# Patient Record
Sex: MALE | Race: OTHER | NOT HISPANIC OR LATINO | URBAN - METROPOLITAN AREA
[De-identification: names, ages, dates, MRNs, and addresses within clinical notes are randomized per-mention and may not be internally consistent; named-entity substitution may affect disease eponyms.]

---

## 2018-11-01 ENCOUNTER — EMERGENCY (EMERGENCY)
Facility: HOSPITAL | Age: 68
LOS: 1 days | Discharge: HOME | End: 2018-11-01
Attending: EMERGENCY MEDICINE | Admitting: EMERGENCY MEDICINE
Payer: MEDICARE

## 2018-11-01 VITALS
OXYGEN SATURATION: 98 % | TEMPERATURE: 99 F | HEART RATE: 91 BPM | DIASTOLIC BLOOD PRESSURE: 79 MMHG | RESPIRATION RATE: 17 BRPM | SYSTOLIC BLOOD PRESSURE: 180 MMHG | WEIGHT: 315 LBS

## 2018-11-01 VITALS
DIASTOLIC BLOOD PRESSURE: 89 MMHG | TEMPERATURE: 98 F | SYSTOLIC BLOOD PRESSURE: 169 MMHG | RESPIRATION RATE: 16 BRPM | OXYGEN SATURATION: 97 % | HEART RATE: 78 BPM

## 2018-11-01 DIAGNOSIS — K59.00 CONSTIPATION, UNSPECIFIED: ICD-10-CM

## 2018-11-01 DIAGNOSIS — R10.9 UNSPECIFIED ABDOMINAL PAIN: ICD-10-CM

## 2018-11-01 DIAGNOSIS — F43.0 ACUTE STRESS REACTION: ICD-10-CM

## 2018-11-01 DIAGNOSIS — Z98.890 OTHER SPECIFIED POSTPROCEDURAL STATES: Chronic | ICD-10-CM

## 2018-11-01 DIAGNOSIS — R11.0 NAUSEA: ICD-10-CM

## 2018-11-01 LAB
ALBUMIN SERPL ELPH-MCNC: 4.2 G/DL — SIGNIFICANT CHANGE UP (ref 3.3–5)
ALP SERPL-CCNC: 69 U/L — SIGNIFICANT CHANGE UP (ref 40–120)
ALT FLD-CCNC: 15 U/L — SIGNIFICANT CHANGE UP (ref 10–45)
ANION GAP SERPL CALC-SCNC: 13 MMOL/L — SIGNIFICANT CHANGE UP (ref 5–17)
AST SERPL-CCNC: 24 U/L — SIGNIFICANT CHANGE UP (ref 10–40)
BASOPHILS NFR BLD AUTO: 0.1 % — SIGNIFICANT CHANGE UP (ref 0–2)
BILIRUB SERPL-MCNC: 0.7 MG/DL — SIGNIFICANT CHANGE UP (ref 0.2–1.2)
BUN SERPL-MCNC: 14 MG/DL — SIGNIFICANT CHANGE UP (ref 7–23)
CALCIUM SERPL-MCNC: 9.2 MG/DL — SIGNIFICANT CHANGE UP (ref 8.4–10.5)
CHLORIDE SERPL-SCNC: 99 MMOL/L — SIGNIFICANT CHANGE UP (ref 96–108)
CO2 SERPL-SCNC: 26 MMOL/L — SIGNIFICANT CHANGE UP (ref 22–31)
CREAT SERPL-MCNC: 0.93 MG/DL — SIGNIFICANT CHANGE UP (ref 0.5–1.3)
GLUCOSE SERPL-MCNC: 111 MG/DL — HIGH (ref 70–99)
HCT VFR BLD CALC: 39.4 % — SIGNIFICANT CHANGE UP (ref 39–50)
HGB BLD-MCNC: 12.7 G/DL — LOW (ref 13–17)
LIDOCAIN IGE QN: 24 U/L — SIGNIFICANT CHANGE UP (ref 7–60)
LYMPHOCYTES # BLD AUTO: 12.8 % — LOW (ref 13–44)
MCHC RBC-ENTMCNC: 25.2 PG — LOW (ref 27–34)
MCHC RBC-ENTMCNC: 32.2 G/DL — SIGNIFICANT CHANGE UP (ref 32–36)
MCV RBC AUTO: 78.2 FL — LOW (ref 80–100)
MONOCYTES NFR BLD AUTO: 4.4 % — SIGNIFICANT CHANGE UP (ref 2–14)
NEUTROPHILS NFR BLD AUTO: 82.7 % — HIGH (ref 43–77)
PLATELET # BLD AUTO: 165 K/UL — SIGNIFICANT CHANGE UP (ref 150–400)
POTASSIUM SERPL-MCNC: 4.1 MMOL/L — SIGNIFICANT CHANGE UP (ref 3.5–5.3)
POTASSIUM SERPL-SCNC: 4.1 MMOL/L — SIGNIFICANT CHANGE UP (ref 3.5–5.3)
PROT SERPL-MCNC: 7.1 G/DL — SIGNIFICANT CHANGE UP (ref 6–8.3)
RBC # BLD: 5.04 M/UL — SIGNIFICANT CHANGE UP (ref 4.2–5.8)
RBC # FLD: 13.9 % — SIGNIFICANT CHANGE UP (ref 10.3–16.9)
SODIUM SERPL-SCNC: 138 MMOL/L — SIGNIFICANT CHANGE UP (ref 135–145)
WBC # BLD: 7.6 K/UL — SIGNIFICANT CHANGE UP (ref 3.8–10.5)
WBC # FLD AUTO: 7.6 K/UL — SIGNIFICANT CHANGE UP (ref 3.8–10.5)

## 2018-11-01 PROCEDURE — 99283 EMERGENCY DEPT VISIT LOW MDM: CPT | Mod: 25

## 2018-11-01 PROCEDURE — 85025 COMPLETE CBC W/AUTO DIFF WBC: CPT

## 2018-11-01 PROCEDURE — 99284 EMERGENCY DEPT VISIT MOD MDM: CPT

## 2018-11-01 PROCEDURE — 83690 ASSAY OF LIPASE: CPT

## 2018-11-01 PROCEDURE — 80053 COMPREHEN METABOLIC PANEL: CPT

## 2018-11-01 PROCEDURE — 36415 COLL VENOUS BLD VENIPUNCTURE: CPT

## 2018-11-01 PROCEDURE — 96360 HYDRATION IV INFUSION INIT: CPT

## 2018-11-01 RX ORDER — SODIUM CHLORIDE 9 MG/ML
1000 INJECTION INTRAMUSCULAR; INTRAVENOUS; SUBCUTANEOUS ONCE
Qty: 0 | Refills: 0 | Status: COMPLETED | OUTPATIENT
Start: 2018-11-01 | End: 2018-11-01

## 2018-11-01 RX ADMIN — SODIUM CHLORIDE 1000 MILLILITER(S): 9 INJECTION INTRAMUSCULAR; INTRAVENOUS; SUBCUTANEOUS at 13:25

## 2018-11-01 RX ADMIN — SODIUM CHLORIDE 2000 MILLILITER(S): 9 INJECTION INTRAMUSCULAR; INTRAVENOUS; SUBCUTANEOUS at 12:15

## 2018-11-01 NOTE — ED ADULT NURSE NOTE - NSIMPLEMENTINTERV_GEN_ALL_ED
Implemented All Universal Safety Interventions:  Todd to call system. Call bell, personal items and telephone within reach. Instruct patient to call for assistance. Room bathroom lighting operational. Non-slip footwear when patient is off stretcher. Physically safe environment: no spills, clutter or unnecessary equipment. Stretcher in lowest position, wheels locked, appropriate side rails in place.

## 2018-11-01 NOTE — ED PROVIDER NOTE - OBJECTIVE STATEMENT
69 y/o male with hx of psoriasis c/o abd pain, nausea and constipation x 4 days. pt states ate some oatmeal 4 days ago and developed pain and nausea shortly afterwards. pt notes he has been under a lot of stress as well. Pt notes BM 2 days ago and then small BM today. pt notes pain improved after passing gas and small BM today. no fever or chills. no bloody stool or melena. no back pain. no urinary sx's. no further complaints.

## 2018-11-01 NOTE — ED ADULT NURSE NOTE - OBJECTIVE STATEMENT
patient c/o abd pain x 3 days that currently has resided. Pt states this morning "I vomiting" and "I was just spit."

## 2018-11-01 NOTE — ED PROVIDER NOTE - ATTENDING CONTRIBUTION TO CARE
67 yo male h/o pvd, psoriasis c/o abd pain (bloated, distended sensation), nausea and constipation x 4 days after eating reheated oatmeal.  No fever.  Pt reports sx improved after he passed a lot of gas and had small bm pta.  No prior abd surg, h/o sbo.  No urinary sx.  No current pain, n/v.  No cp, palpitations.  Pt also c/o chronic le skin changes and swelling.  Well appearing, nad, nc/at, lung cta, heart reg, abd soft, nabs, nt/nd, ext no c/c, + chronic brawny skin changes and 1+ edema, dp +1, no gross neuro deficits.  Pt w resolved abd pain w/o ttp on exam, low risk for sbo and now w + flatus and bm, no vomiting.  Pt also c/o chronic le sx - suspect 2/2 pvd.  Plan labs, sw/cm, will try to get compression stockings.  Plan for dc.

## 2018-11-01 NOTE — ED PROVIDER NOTE - MEDICAL DECISION MAKING DETAILS
abdominal pain, nausea and consitpation. pt reports BM today. abd non tender and soft. do not suspect obstruction at this time given pt reports passing gas and BM and abd non tender. labs noted and d/w pt. pt with b/l PVD. ED referral coordinator to refer to vascular. f/u in clinic.

## 2018-11-05 ENCOUNTER — EMERGENCY (EMERGENCY)
Facility: HOSPITAL | Age: 68
LOS: 1 days | Discharge: HOME | End: 2018-11-05
Attending: EMERGENCY MEDICINE | Admitting: EMERGENCY MEDICINE
Payer: MEDICARE

## 2018-11-05 VITALS
DIASTOLIC BLOOD PRESSURE: 78 MMHG | TEMPERATURE: 98 F | RESPIRATION RATE: 17 BRPM | HEART RATE: 67 BPM | OXYGEN SATURATION: 98 % | SYSTOLIC BLOOD PRESSURE: 158 MMHG

## 2018-11-05 VITALS
OXYGEN SATURATION: 99 % | TEMPERATURE: 99 F | WEIGHT: 169.54 LBS | RESPIRATION RATE: 18 BRPM | SYSTOLIC BLOOD PRESSURE: 220 MMHG | HEART RATE: 95 BPM | DIASTOLIC BLOOD PRESSURE: 84 MMHG

## 2018-11-05 DIAGNOSIS — K59.00 CONSTIPATION, UNSPECIFIED: ICD-10-CM

## 2018-11-05 DIAGNOSIS — R14.0 ABDOMINAL DISTENSION (GASEOUS): ICD-10-CM

## 2018-11-05 DIAGNOSIS — Z98.890 OTHER SPECIFIED POSTPROCEDURAL STATES: Chronic | ICD-10-CM

## 2018-11-05 DIAGNOSIS — Z91.048 OTHER NONMEDICINAL SUBSTANCE ALLERGY STATUS: ICD-10-CM

## 2018-11-05 PROBLEM — L40.9 PSORIASIS, UNSPECIFIED: Chronic | Status: ACTIVE | Noted: 2018-11-01

## 2018-11-05 LAB
APPEARANCE UR: CLEAR — SIGNIFICANT CHANGE UP
BILIRUB UR-MCNC: NEGATIVE — SIGNIFICANT CHANGE UP
COLOR SPEC: YELLOW — SIGNIFICANT CHANGE UP
DIFF PNL FLD: NEGATIVE — SIGNIFICANT CHANGE UP
GLUCOSE UR QL: NEGATIVE — SIGNIFICANT CHANGE UP
KETONES UR-MCNC: ABNORMAL MG/DL
LEUKOCYTE ESTERASE UR-ACNC: NEGATIVE — SIGNIFICANT CHANGE UP
NITRITE UR-MCNC: NEGATIVE — SIGNIFICANT CHANGE UP
PH UR: 8 — SIGNIFICANT CHANGE UP (ref 5–8)
PROT UR-MCNC: NEGATIVE MG/DL — SIGNIFICANT CHANGE UP
SP GR SPEC: <=1.005 — SIGNIFICANT CHANGE UP (ref 1–1.03)
UROBILINOGEN FLD QL: 1 E.U./DL — SIGNIFICANT CHANGE UP

## 2018-11-05 PROCEDURE — 93010 ELECTROCARDIOGRAM REPORT: CPT

## 2018-11-05 PROCEDURE — 99284 EMERGENCY DEPT VISIT MOD MDM: CPT | Mod: 25

## 2018-11-05 PROCEDURE — 36415 COLL VENOUS BLD VENIPUNCTURE: CPT

## 2018-11-05 PROCEDURE — 85025 COMPLETE CBC W/AUTO DIFF WBC: CPT

## 2018-11-05 PROCEDURE — 74177 CT ABD & PELVIS W/CONTRAST: CPT | Mod: 26

## 2018-11-05 PROCEDURE — 81003 URINALYSIS AUTO W/O SCOPE: CPT

## 2018-11-05 PROCEDURE — 74177 CT ABD & PELVIS W/CONTRAST: CPT

## 2018-11-05 PROCEDURE — 83690 ASSAY OF LIPASE: CPT

## 2018-11-05 PROCEDURE — 80053 COMPREHEN METABOLIC PANEL: CPT

## 2018-11-05 PROCEDURE — 87086 URINE CULTURE/COLONY COUNT: CPT

## 2018-11-05 PROCEDURE — 93005 ELECTROCARDIOGRAM TRACING: CPT

## 2018-11-05 RX ORDER — DOCUSATE SODIUM 100 MG
100 CAPSULE ORAL ONCE
Qty: 0 | Refills: 0 | Status: COMPLETED | OUTPATIENT
Start: 2018-11-05 | End: 2018-11-05

## 2018-11-05 RX ORDER — IOHEXOL 300 MG/ML
30 INJECTION, SOLUTION INTRAVENOUS ONCE
Qty: 0 | Refills: 0 | Status: DISCONTINUED | OUTPATIENT
Start: 2018-11-05 | End: 2018-11-05

## 2018-11-05 RX ORDER — POLYETHYLENE GLYCOL 3350 17 G/17G
17 POWDER, FOR SOLUTION ORAL ONCE
Qty: 0 | Refills: 0 | Status: COMPLETED | OUTPATIENT
Start: 2018-11-05 | End: 2018-11-05

## 2018-11-05 RX ORDER — DOCUSATE SODIUM 100 MG
1 CAPSULE ORAL
Qty: 15 | Refills: 0 | OUTPATIENT
Start: 2018-11-05 | End: 2018-11-09

## 2018-11-05 RX ORDER — POLYETHYLENE GLYCOL 3350 17 G/17G
17 POWDER, FOR SOLUTION ORAL
Qty: 1 | Refills: 0 | OUTPATIENT
Start: 2018-11-05 | End: 2018-11-09

## 2018-11-05 RX ADMIN — Medication 100 MILLIGRAM(S): at 13:50

## 2018-11-05 RX ADMIN — POLYETHYLENE GLYCOL 3350 17 GRAM(S): 17 POWDER, FOR SOLUTION ORAL at 13:50

## 2018-11-05 NOTE — ED ADULT NURSE NOTE - OBJECTIVE STATEMENT
c/o diffused abdominal cramping and constipation x 4 days.  Last BM this am, but minimal per patient.  Patient verbalized taking maalox w/ minimal relief.  No recent sx or use of narcotics, n/v/d, fever, chills. Labs sent.  Patient remains in stable condition.  Awaiting lab results and further studies.  Continue to monitor.

## 2018-11-05 NOTE — ED ADULT NURSE NOTE - CHPI ED NUR SYMPTOMS NEG
no nausea/no abdominal distension/no vomiting/no burning urination/no blood in stool/no chills/no hematuria/no dysuria/no fever/no diarrhea

## 2018-11-05 NOTE — ED PROVIDER NOTE - ATTENDING CONTRIBUTION TO CARE
Pt is a 69yo m, no pmh/ psh, who p/w constipation with no bm x 5 days. Seen in ed 3d ago for same sx's and did not take any recommended medications at home. + small amt of liquidy stools in the past few days. + abd distention, no pain. Is passing flatus. No n/v, fever, chills, urinary sx's. Afebrile. HDS. WNWD m in nad. + s1, s2, rrr. Lungs cta b/l. Abd soft, mildly distended, non-tender, no guarding/ rebound. Labs wnl. Will arrange for ct a/p to r/o obstruction. Will continue to monitor. Pt is a 67yo m, no pmh/ psh, who p/w constipation with no bm x 5 days. Seen in ed 3d ago for same sx's and did not take any recommended medications at home. + small amt of liquidy stools in the past few days. + abd distention, no pain. Is passing flatus. No n/v, fever, chills, urinary sx's. No cp, sob. Afebrile. HDS. WNWD m in nad. + s1, s2, rrr. Lungs cta b/l. Abd soft, mildly distended, non-tender, no guarding/ rebound. Labs wnl. Will arrange for ct a/p to r/o obstruction. Will continue to monitor.

## 2018-11-05 NOTE — ED PROVIDER NOTE - OBJECTIVE STATEMENT
67 y/o male with a PMHx of psoriasis is present with feeling "bloated" and not being able to make a bm for the past 10 days. Pt states he felt constipated and has been 67 y/o male with a PMHx of psoriasis is present with feeling "bloated" and not being able to make a bm for the past 10 days. Pt states he felt constipated and has been passing what looks like a "watery type of stool", but feels as if bm is not complete. He reports only a small amount of bm yesterday and is still passing gas. 67 y/o male with a PMHx of psoriasis is present with feeling "bloated" and not being able to make a bm for the past 10 days. Pt states he felt constipated and has been passing what looks like a "watery type of stool", but feels as if bm is not complete. He reports only a small amount of bm yesterday and is still passing gas. Pt denies the following: fever, chills, nausea, vomiting, hx of abdominal surgery, abdominal pain, cough, travel, blood in stool, urinary symptoms. Pt states his diet has been changed because he was recently evicted from his apartment and eating anything he can, therefore concerned incorporating different types of food is causing him to be backed up. He states he was advised to take Miralax and colace, but can't afford it so has not been compliant with treatment.

## 2018-11-05 NOTE — ED ADULT TRIAGE NOTE - CHIEF COMPLAINT QUOTE
"I was here friday and im fed up with everything and they didn't really help me and I still got abdominal pain and cant go to the bathroom"

## 2018-11-07 LAB
CULTURE RESULTS: SIGNIFICANT CHANGE UP
SPECIMEN SOURCE: SIGNIFICANT CHANGE UP

## 2018-11-12 ENCOUNTER — APPOINTMENT (OUTPATIENT)
Dept: DERMATOLOGY | Facility: CLINIC | Age: 68
End: 2018-11-12

## 2018-11-14 VITALS
RESPIRATION RATE: 19 BRPM | OXYGEN SATURATION: 97 % | SYSTOLIC BLOOD PRESSURE: 165 MMHG | TEMPERATURE: 99 F | DIASTOLIC BLOOD PRESSURE: 91 MMHG | HEART RATE: 74 BPM

## 2018-11-14 LAB
ALBUMIN SERPL ELPH-MCNC: 4.2 G/DL — SIGNIFICANT CHANGE UP (ref 3.3–5)
ALP SERPL-CCNC: 59 U/L — SIGNIFICANT CHANGE UP (ref 40–120)
ALT FLD-CCNC: 22 U/L — SIGNIFICANT CHANGE UP (ref 10–45)
ANION GAP SERPL CALC-SCNC: 8 MMOL/L — SIGNIFICANT CHANGE UP (ref 5–17)
APTT BLD: 32.3 SEC — SIGNIFICANT CHANGE UP (ref 27.5–36.3)
AST SERPL-CCNC: 33 U/L — SIGNIFICANT CHANGE UP (ref 10–40)
BASOPHILS NFR BLD AUTO: 0.6 % — SIGNIFICANT CHANGE UP (ref 0–2)
BILIRUB SERPL-MCNC: 0.8 MG/DL — SIGNIFICANT CHANGE UP (ref 0.2–1.2)
BUN SERPL-MCNC: 11 MG/DL — SIGNIFICANT CHANGE UP (ref 7–23)
CALCIUM SERPL-MCNC: 9.2 MG/DL — SIGNIFICANT CHANGE UP (ref 8.4–10.5)
CHLORIDE SERPL-SCNC: 98 MMOL/L — SIGNIFICANT CHANGE UP (ref 96–108)
CO2 SERPL-SCNC: 28 MMOL/L — SIGNIFICANT CHANGE UP (ref 22–31)
CREAT SERPL-MCNC: 0.96 MG/DL — SIGNIFICANT CHANGE UP (ref 0.5–1.3)
EOSINOPHIL NFR BLD AUTO: 1.1 % — SIGNIFICANT CHANGE UP (ref 0–6)
GLUCOSE SERPL-MCNC: 102 MG/DL — HIGH (ref 70–99)
HCT VFR BLD CALC: 39.8 % — SIGNIFICANT CHANGE UP (ref 39–50)
HGB BLD-MCNC: 13.4 G/DL — SIGNIFICANT CHANGE UP (ref 13–17)
INR BLD: 1.37 — HIGH (ref 0.88–1.16)
LACTATE SERPL-SCNC: 1.1 MMOL/L — SIGNIFICANT CHANGE UP (ref 0.5–2)
LIDOCAIN IGE QN: 39 U/L — SIGNIFICANT CHANGE UP (ref 7–60)
LYMPHOCYTES # BLD AUTO: 25.8 % — SIGNIFICANT CHANGE UP (ref 13–44)
MCHC RBC-ENTMCNC: 25.5 PG — LOW (ref 27–34)
MCHC RBC-ENTMCNC: 33.7 G/DL — SIGNIFICANT CHANGE UP (ref 32–36)
MCV RBC AUTO: 75.8 FL — LOW (ref 80–100)
MONOCYTES NFR BLD AUTO: 8.9 % — SIGNIFICANT CHANGE UP (ref 2–14)
NEUTROPHILS NFR BLD AUTO: 63.6 % — SIGNIFICANT CHANGE UP (ref 43–77)
PLATELET # BLD AUTO: 187 K/UL — SIGNIFICANT CHANGE UP (ref 150–400)
POTASSIUM SERPL-MCNC: 3.4 MMOL/L — LOW (ref 3.5–5.3)
POTASSIUM SERPL-SCNC: 3.4 MMOL/L — LOW (ref 3.5–5.3)
PROT SERPL-MCNC: 6.9 G/DL — SIGNIFICANT CHANGE UP (ref 6–8.3)
PROTHROM AB SERPL-ACNC: 15.6 SEC — HIGH (ref 10–12.9)
RBC # BLD: 5.25 M/UL — SIGNIFICANT CHANGE UP (ref 4.2–5.8)
RBC # FLD: 13.6 % — SIGNIFICANT CHANGE UP (ref 10.3–16.9)
SODIUM SERPL-SCNC: 134 MMOL/L — LOW (ref 135–145)
WBC # BLD: 10.2 K/UL — SIGNIFICANT CHANGE UP (ref 3.8–10.5)
WBC # FLD AUTO: 10.2 K/UL — SIGNIFICANT CHANGE UP (ref 3.8–10.5)

## 2018-11-14 PROCEDURE — 74022 RADEX COMPL AQT ABD SERIES: CPT | Mod: 26

## 2018-11-14 RX ORDER — SOD SULF/SODIUM/NAHCO3/KCL/PEG
4000 SOLUTION, RECONSTITUTED, ORAL ORAL ONCE
Qty: 0 | Refills: 0 | Status: DISCONTINUED | OUTPATIENT
Start: 2018-11-14 | End: 2018-11-14

## 2018-11-14 RX ORDER — LACTULOSE 10 G/15ML
20 SOLUTION ORAL ONCE
Qty: 0 | Refills: 0 | Status: DISCONTINUED | OUTPATIENT
Start: 2018-11-14 | End: 2018-11-14

## 2018-11-14 RX ORDER — IOHEXOL 300 MG/ML
30 INJECTION, SOLUTION INTRAVENOUS ONCE
Qty: 0 | Refills: 0 | Status: COMPLETED | OUTPATIENT
Start: 2018-11-14 | End: 2018-11-14

## 2018-11-14 RX ADMIN — Medication 10 MILLIGRAM(S): at 22:43

## 2018-11-14 RX ADMIN — IOHEXOL 30 MILLILITER(S): 300 INJECTION, SOLUTION INTRAVENOUS at 23:09

## 2018-11-14 NOTE — ED ADULT TRIAGE NOTE - CHIEF COMPLAINT QUOTE
Pt states "I have been here many times for constipation and no one is doing anything to fix it. I have seen 5 doctors and they all say the same thing but they send me home anyway. I have a lot of noise in my stomach and when I go to the bathroom only water comes out x 17 days." Denies vomiting. Pt with d/c papers with dx constipation.

## 2018-11-14 NOTE — ED ADULT NURSE NOTE - OBJECTIVE STATEMENT
69 y/o pt AOx3 arrived to the ED c/o having watery stool x 17days. Pt states he has been seen in the ED and no one has done anything for him.

## 2018-11-14 NOTE — ED PROVIDER NOTE - MEDICAL DECISION MAKING DETAILS
Pt w diffuse abdominal distension, bloating sensation. Last CT with ileus, states cannot remember when last passed full BM, watery bowel movements since then, initial XR with dilated loops, ?air fluid levels. dulcolax suppository, will obtain CT a/p to eval for obs. Anticipate dc if  no acute process with GI fu. Pt w diffuse abdominal distension, bloating sensation. Last CT with ileus, states cannot remember when last passed full BM, watery bowel movements since then, initial XR with dilated loops, appearance of air fluid levels. dulcolax suppository, will obtain CT a/p to eval for obs. Anticipate dc if  no acute process with GI fu.

## 2018-11-14 NOTE — ED PROVIDER NOTE - PHYSICAL EXAMINATION
CONSTITUTIONAL: Well appearing, well nourished, awake, alert and in no apparent distress.  HEENT: Head is atraumatic. Eyes clear bilaterally, normal EOMI. Airway patent.  CARDIAC: Normal rate, regular rhythm.  Heart sounds S1, S2.   RESPIRATORY: Breath sounds clear and equal bilaterally. no tachypnea, respiratory distress.   GASTROINTESTINAL: Abdomen soft, mildly distended, no g/r  MUSCULOSKELETAL: Spine appears normal, no midline spinal tenderness, range of motion is not limited, no muscle or joint tenderness. no bony tenderness. no JVD, peripheral edema.   NEUROLOGICAL: Alert and oriented, no focal deficits, no motor or sensory deficits.  SKIN: Skin normal color for race, warm, dry and intact. No evidence of rash.  PSYCHIATRIC: Alert and oriented to person, place, time/situation. normal mood and affect. no apparent risk to self or others. CONSTITUTIONAL: Well appearing, well nourished, awake, alert and in no apparent distress.  HEENT: Head is atraumatic. Eyes clear bilaterally, normal EOMI. Airway patent.  CARDIAC: Normal rate, regular rhythm.  Heart sounds S1, S2.   RESPIRATORY: Breath sounds clear and equal bilaterally. no tachypnea, respiratory distress.   GASTROINTESTINAL: Abdomen soft, mildly distended, no g/r  MUSCULOSKELETAL: Spine appears normal, no midline spinal tenderness, range of motion is not limited, no muscle or joint tenderness. no bony tenderness. no JVD, peripheral edema.   NEUROLOGICAL: Alert and oriented, no focal deficits, no motor or sensory deficits.  SKIN: Skin normal color for race, warm, dry and intact. No evidence of rash.  PSYCHIATRIC: Alert and oriented to person, place, time/situation. normal mood and affect. no apparent risk to self or others.  RectaL: no obs stool mass, blood, black stool

## 2018-11-14 NOTE — ED PROVIDER NOTE - OBJECTIVE STATEMENT
69 y/o male with hx of psoriasis w complaints ongoing abdominal discomfort, bloating, distension since the first time since first seen here at [facility] on Nov 1st. using stool softners, miralax given on prior dc w minimal relief. Prior CT scan showing ileus with no def stricture. intermittent vomiting nbnb, last episode last night with white material per pt. last passed  gas and watery stool described as dark in nature at 530pm today and attributing it to peptobismol use. no melena, red blood in stool, chest pain, sob, f/c. no prior abdominal surgeries.

## 2018-11-15 ENCOUNTER — INPATIENT (INPATIENT)
Facility: HOSPITAL | Age: 68
LOS: 0 days | Discharge: HOME | DRG: 392 | End: 2018-11-16
Attending: SURGERY | Admitting: SURGERY
Payer: MEDICARE

## 2018-11-15 DIAGNOSIS — Z98.890 OTHER SPECIFIED POSTPROCEDURAL STATES: Chronic | ICD-10-CM

## 2018-11-15 LAB
ANION GAP SERPL CALC-SCNC: 7 MMOL/L — SIGNIFICANT CHANGE UP (ref 5–17)
APPEARANCE UR: CLEAR — SIGNIFICANT CHANGE UP
BASOPHILS NFR BLD AUTO: 0.4 % — SIGNIFICANT CHANGE UP (ref 0–2)
BILIRUB UR-MCNC: NEGATIVE — SIGNIFICANT CHANGE UP
BUN SERPL-MCNC: 9 MG/DL — SIGNIFICANT CHANGE UP (ref 7–23)
CALCIUM SERPL-MCNC: 8.6 MG/DL — SIGNIFICANT CHANGE UP (ref 8.4–10.5)
CHLORIDE SERPL-SCNC: 99 MMOL/L — SIGNIFICANT CHANGE UP (ref 96–108)
CO2 SERPL-SCNC: 28 MMOL/L — SIGNIFICANT CHANGE UP (ref 22–31)
COLOR SPEC: YELLOW — SIGNIFICANT CHANGE UP
CREAT SERPL-MCNC: 0.91 MG/DL — SIGNIFICANT CHANGE UP (ref 0.5–1.3)
DIFF PNL FLD: NEGATIVE — SIGNIFICANT CHANGE UP
EOSINOPHIL NFR BLD AUTO: 1.6 % — SIGNIFICANT CHANGE UP (ref 0–6)
GLUCOSE SERPL-MCNC: 97 MG/DL — SIGNIFICANT CHANGE UP (ref 70–99)
GLUCOSE UR QL: NEGATIVE — SIGNIFICANT CHANGE UP
HCT VFR BLD CALC: 35.9 % — LOW (ref 39–50)
HGB BLD-MCNC: 12.2 G/DL — LOW (ref 13–17)
KETONES UR-MCNC: ABNORMAL MG/DL
LEUKOCYTE ESTERASE UR-ACNC: NEGATIVE — SIGNIFICANT CHANGE UP
LYMPHOCYTES # BLD AUTO: 12.1 % — LOW (ref 13–44)
MAGNESIUM SERPL-MCNC: 2.1 MG/DL — SIGNIFICANT CHANGE UP (ref 1.6–2.6)
MCHC RBC-ENTMCNC: 26 PG — LOW (ref 27–34)
MCHC RBC-ENTMCNC: 34 G/DL — SIGNIFICANT CHANGE UP (ref 32–36)
MCV RBC AUTO: 76.5 FL — LOW (ref 80–100)
MONOCYTES NFR BLD AUTO: 8.2 % — SIGNIFICANT CHANGE UP (ref 2–14)
NEUTROPHILS NFR BLD AUTO: 77.7 % — HIGH (ref 43–77)
NITRITE UR-MCNC: NEGATIVE — SIGNIFICANT CHANGE UP
PH UR: 6 — SIGNIFICANT CHANGE UP (ref 5–8)
PHOSPHATE SERPL-MCNC: 3.7 MG/DL — SIGNIFICANT CHANGE UP (ref 2.5–4.5)
PLATELET # BLD AUTO: 132 K/UL — LOW (ref 150–400)
POTASSIUM SERPL-MCNC: 3 MMOL/L — LOW (ref 3.5–5.3)
POTASSIUM SERPL-SCNC: 3 MMOL/L — LOW (ref 3.5–5.3)
PROT UR-MCNC: NEGATIVE MG/DL — SIGNIFICANT CHANGE UP
RBC # BLD: 4.69 M/UL — SIGNIFICANT CHANGE UP (ref 4.2–5.8)
RBC # FLD: 13.7 % — SIGNIFICANT CHANGE UP (ref 10.3–16.9)
SODIUM SERPL-SCNC: 134 MMOL/L — LOW (ref 135–145)
SP GR SPEC: 1.02 — SIGNIFICANT CHANGE UP (ref 1–1.03)
UROBILINOGEN FLD QL: 0.2 E.U./DL — SIGNIFICANT CHANGE UP
WBC # BLD: 6.8 K/UL — SIGNIFICANT CHANGE UP (ref 3.8–10.5)
WBC # FLD AUTO: 6.8 K/UL — SIGNIFICANT CHANGE UP (ref 3.8–10.5)

## 2018-11-15 PROCEDURE — 74177 CT ABD & PELVIS W/CONTRAST: CPT | Mod: 26

## 2018-11-15 PROCEDURE — 99285 EMERGENCY DEPT VISIT HI MDM: CPT

## 2018-11-15 PROCEDURE — 99223 1ST HOSP IP/OBS HIGH 75: CPT | Mod: GC

## 2018-11-15 PROCEDURE — 99223 1ST HOSP IP/OBS HIGH 75: CPT

## 2018-11-15 RX ORDER — ACETAMINOPHEN 500 MG
650 TABLET ORAL EVERY 6 HOURS
Qty: 0 | Refills: 0 | Status: DISCONTINUED | OUTPATIENT
Start: 2018-11-15 | End: 2018-11-16

## 2018-11-15 RX ORDER — ACETAMINOPHEN 500 MG
325 TABLET ORAL EVERY 6 HOURS
Qty: 0 | Refills: 0 | Status: DISCONTINUED | OUTPATIENT
Start: 2018-11-15 | End: 2018-11-16

## 2018-11-15 RX ORDER — SODIUM CHLORIDE 9 MG/ML
1000 INJECTION, SOLUTION INTRAVENOUS
Qty: 0 | Refills: 0 | Status: DISCONTINUED | OUTPATIENT
Start: 2018-11-15 | End: 2018-11-16

## 2018-11-15 RX ORDER — ONDANSETRON 8 MG/1
4 TABLET, FILM COATED ORAL EVERY 6 HOURS
Qty: 0 | Refills: 0 | Status: DISCONTINUED | OUTPATIENT
Start: 2018-11-15 | End: 2018-11-16

## 2018-11-15 RX ORDER — MULTIVIT WITH MIN/MFOLATE/K2 340-15/3 G
300 POWDER (GRAM) ORAL ONCE
Qty: 0 | Refills: 0 | Status: COMPLETED | OUTPATIENT
Start: 2018-11-15 | End: 2018-11-15

## 2018-11-15 RX ORDER — MULTIVIT WITH MIN/MFOLATE/K2 340-15/3 G
300 POWDER (GRAM) ORAL ONCE
Qty: 0 | Refills: 0 | Status: DISCONTINUED | OUTPATIENT
Start: 2018-11-15 | End: 2018-11-15

## 2018-11-15 RX ORDER — POTASSIUM CHLORIDE 20 MEQ
10 PACKET (EA) ORAL
Qty: 0 | Refills: 0 | Status: COMPLETED | OUTPATIENT
Start: 2018-11-15 | End: 2018-11-15

## 2018-11-15 RX ORDER — HEPARIN SODIUM 5000 [USP'U]/ML
5000 INJECTION INTRAVENOUS; SUBCUTANEOUS EVERY 8 HOURS
Qty: 0 | Refills: 0 | Status: DISCONTINUED | OUTPATIENT
Start: 2018-11-15 | End: 2018-11-16

## 2018-11-15 RX ADMIN — Medication 100 MILLIEQUIVALENT(S): at 08:27

## 2018-11-15 RX ADMIN — Medication 100 MILLIEQUIVALENT(S): at 10:08

## 2018-11-15 RX ADMIN — HEPARIN SODIUM 5000 UNIT(S): 5000 INJECTION INTRAVENOUS; SUBCUTANEOUS at 05:58

## 2018-11-15 RX ADMIN — SODIUM CHLORIDE 75 MILLILITER(S): 9 INJECTION, SOLUTION INTRAVENOUS at 03:46

## 2018-11-15 RX ADMIN — Medication 300 MILLILITER(S): at 23:45

## 2018-11-15 RX ADMIN — Medication 650 MILLIGRAM(S): at 19:04

## 2018-11-15 RX ADMIN — Medication 650 MILLIGRAM(S): at 16:32

## 2018-11-15 RX ADMIN — Medication 100 MILLIEQUIVALENT(S): at 11:46

## 2018-11-15 RX ADMIN — HEPARIN SODIUM 5000 UNIT(S): 5000 INJECTION INTRAVENOUS; SUBCUTANEOUS at 23:45

## 2018-11-15 RX ADMIN — HEPARIN SODIUM 5000 UNIT(S): 5000 INJECTION INTRAVENOUS; SUBCUTANEOUS at 15:56

## 2018-11-15 NOTE — CONSULT NOTE ADULT - SUBJECTIVE AND OBJECTIVE BOX
HPI:  69 yo M, w/ PMHx of psoriasis has been seen in [facility] ED 2 separate encounters, now presents with similar complaints of distension, trouble having BM, and generalized abdominal discomfort and reason for GI consult.  Complains symptoms have been present for the past 2 weeks while the patient is under immense stress due to ongoing legal situation. Patient was seen in ED on 11/1 & 11/5 was diagnosed with ileus and prescribed laxatives and stool softeners provided some relief. Patient's last BM was yesterday at home, without nausea or vomiting. Also pt had multiple BM in ED, after water enema, and Go-Lytely given. Denies any fevers, N/V/D, history of IBD/IBS, history of abdominal surgery, recent abx use, melena, and hematochezia     Patient's last colonoscopy was 10 years ago, which he reports was normal. No history of UC or Crohn's. Denies any episodes of diverticulitis, or LLQ pain requiring admission or ABX.     T(C): 36.9 (14 Nov 2018 21:44), Max: 37.1 (14 Nov 2018 21:01)  T(F): 98.5 (14 Nov 2018 21:44), Max: 98.7 (14 Nov 2018 21:01)  HR: 96 (14 Nov 2018 21:44) (74 - 96)  BP: 168/104 (14 Nov 2018 21:44) (165/91 - 168/104)  RR: 20 (14 Nov 2018 21:44) (19 - 20)  SpO2: 99% (14 Nov 2018 21:44) (97% - 99%) (15 Nov 2018 02:43)    Allergies    No Known Drug Allergies  pollen (Unknown)    Intolerances      Home Medications:    MEDICATIONS:  MEDICATIONS  (STANDING):  heparin  Injectable 5000 Unit(s) SubCutaneous every 8 hours  lactated ringers. 1000 milliLiter(s) (75 mL/Hr) IV Continuous <Continuous>  potassium chloride  10 mEq/100 mL IVPB 10 milliEquivalent(s) IV Intermittent every 1 hour    MEDICATIONS  (PRN):  acetaminophen   Tablet .. 325 milliGRAM(s) Oral every 6 hours PRN Moderate Pain (4 - 6)  acetaminophen   Tablet .. 650 milliGRAM(s) Oral every 6 hours PRN Severe Pain (7 - 10)  ondansetron Injectable 4 milliGRAM(s) IV Push every 6 hours PRN Nausea    PAST MEDICAL & SURGICAL HISTORY:  Psoriasis  H/O knee surgery    FAMILY HISTORY:  No pertinent family history in first degree relatives    SOCIAL HISTORY:  [place]: [ ] Current, [ ] Former, [ ] Never; Pack Years:  Alcohol:  Illicit Drugs:    REVIEW OF SYSTEMS:  CONSTITUTIONAL: No weakness, fevers or chills  HEENT: No visual changes; No vertigo or throat pain   NECK: No pain or stiffness  RESPIRATORY: No cough, wheezing, hemoptysis; No shortness of breath  CARDIOVASCULAR: No chest pain or palpitations  GASTROINTESTINAL: No abdominal or epigastric pain. No nausea, vomiting, or hematemesis; No diarrhea or constipation. No melena or hematochezia.  GENITOURINARY: No dysuria, frequency or hematuria  NEUROLOGICAL: No numbness or weakness  SKIN: No itching, burning, rashes, or lesions   All other 10 review of systems is negative unless indicated above.    Vital Signs Last 24 Hrs  T(C): 36.8 (15 Nov 2018 06:40), Max: 37.1 (14 Nov 2018 21:01)  T(F): 98.3 (15 Nov 2018 06:40), Max: 98.7 (14 Nov 2018 21:01)  HR: 62 (15 Nov 2018 06:40) (62 - 96)  BP: 121/57 (15 Nov 2018 06:40) (121/57 - 168/104)  BP(mean): --  RR: 20 (15 Nov 2018 06:40) (19 - 20)  SpO2: 98% (15 Nov 2018 06:40) (97% - 99%)      PHYSICAL EXAM:    General: Well developed; well nourished; in no acute distress  Eyes: Anicteric sclerae, moist conjunctivae  HENT: Moist mucous membranes  Neck: Trachea midline, supple  Lungs: Normal respiratory effors and no intercostal retractions  Cardiovascular: RRR  Abdomen: Soft, non-tender non-distended; Normal bowel sounds; No rebound or guarding  Extremities: Normal range of motion, No clubbing, cyanosis or edema  Neurological: Alert and oriented x3  Skin: Warm and dry. No obvious rash    LABS:                        12.2   6.8   )-----------( 132      ( 15 Nov 2018 06:33 )             35.9     11-15    134<L>  |  99  |  9   ----------------------------<  97  3.0<L>   |  28  |  0.91    Ca    8.6      15 Nov 2018 06:33  Phos  3.7     11-15  Mg     2.1     11-15    TPro  6.9  /  Alb  4.2  /  TBili  0.8  /  DBili  x   /  AST  33  /  ALT  22  /  AlkPhos  59  11-14        PT/INR - ( 14 Nov 2018 23:04 )   PT: 15.6 sec;   INR: 1.37          PTT - ( 14 Nov 2018 23:04 )  PTT:32.3 sec    RADIOLOGY & ADDITIONAL STUDIES: HPI:  69 yo M, w/ PMHx of psoriasis has been seen in [facility] ED 2 separate encounters, now presents with similar complaints of distension, trouble having BM, and generalized abdominal discomfort and reason for GI consult.  Complains symptoms have been present for the past 2 weeks while the patient is under immense stress due to ongoing legal situation. Patient was seen in ED on 11/1 & 11/5 was diagnosed with ileus and prescribed laxatives and stool softeners provided some relief. Patient's last BM was yesterday at home, without nausea or vomiting. Also pt had multiple BM in ED, after water enema, and Go-Lytely given. Denies any fevers, N/V/D, history of IBD/IBS, history of abdominal surgery, recent abx use, melena, and hematochezia.  Patient's last colonoscopy was 10 years ago, which he reports was normal.    T(C): 36.9 (14 Nov 2018 21:44), Max: 37.1 (14 Nov 2018 21:01)  T(F): 98.5 (14 Nov 2018 21:44), Max: 98.7 (14 Nov 2018 21:01)  HR: 96 (14 Nov 2018 21:44) (74 - 96)  BP: 168/104 (14 Nov 2018 21:44) (165/91 - 168/104)  RR: 20 (14 Nov 2018 21:44) (19 - 20)  SpO2: 99% (14 Nov 2018 21:44) (97% - 99%) (15 Nov 2018 02:43)    Allergies    No Known Drug Allergies  pollen (Unknown)    Intolerances      Home Medications:    MEDICATIONS:  MEDICATIONS  (STANDING):  heparin  Injectable 5000 Unit(s) SubCutaneous every 8 hours  lactated ringers. 1000 milliLiter(s) (75 mL/Hr) IV Continuous <Continuous>  potassium chloride  10 mEq/100 mL IVPB 10 milliEquivalent(s) IV Intermittent every 1 hour    MEDICATIONS  (PRN):  acetaminophen   Tablet .. 325 milliGRAM(s) Oral every 6 hours PRN Moderate Pain (4 - 6)  acetaminophen   Tablet .. 650 milliGRAM(s) Oral every 6 hours PRN Severe Pain (7 - 10)  ondansetron Injectable 4 milliGRAM(s) IV Push every 6 hours PRN Nausea    PAST MEDICAL & SURGICAL HISTORY:  Psoriasis  H/O knee surgery    FAMILY HISTORY:  No pertinent family history in first degree relatives    SOCIAL HISTORY:  [place]: [ ] Current, [ ] Former, [ ] Never; Pack Years:  Alcohol:  Illicit Drugs:    REVIEW OF SYSTEMS:  CONSTITUTIONAL: No weakness, fevers or chills  HEENT: No visual changes; No vertigo or throat pain   NECK: No pain or stiffness  RESPIRATORY: No cough, wheezing, hemoptysis; No shortness of breath  CARDIOVASCULAR: No chest pain or palpitations  GASTROINTESTINAL: No abdominal or epigastric pain. No nausea, vomiting, or hematemesis; No diarrhea or constipation. No melena or hematochezia. Positive distension  GENITOURINARY: No dysuria, frequency or hematuria  NEUROLOGICAL: No numbness or weakness  SKIN: No itching, burning, rashes, or lesions   All other 10 review of systems is negative unless indicated above.    Vital Signs Last 24 Hrs  T(C): 36.8 (15 Nov 2018 06:40), Max: 37.1 (14 Nov 2018 21:01)  T(F): 98.3 (15 Nov 2018 06:40), Max: 98.7 (14 Nov 2018 21:01)  HR: 62 (15 Nov 2018 06:40) (62 - 96)  BP: 121/57 (15 Nov 2018 06:40) (121/57 - 168/104)  BP(mean): --  RR: 20 (15 Nov 2018 06:40) (19 - 20)  SpO2: 98% (15 Nov 2018 06:40) (97% - 99%)      PHYSICAL EXAM:    General: Well developed; well nourished; in no acute distress  Eyes: Anicteric sclerae, moist conjunctivae  HENT: Moist mucous membranes  Neck: Trachea midline, supple  Lungs: Normal respiratory effors and no intercostal retractions  Cardiovascular: RRR  Abdomen: Soft, non-tender. Distended; Hyperactive bowel sounds in LLQ; No rebound or guarding  Extremities: Normal range of motion, No clubbing, cyanosis or edema  Neurological: Alert and oriented x3  Skin: Warm and dry. No obvious rash    LABS:                        12.2   6.8   )-----------( 132      ( 15 Nov 2018 06:33 )             35.9     11-15    134<L>  |  99  |  9   ----------------------------<  97  3.0<L>   |  28  |  0.91    Ca    8.6      15 Nov 2018 06:33  Phos  3.7     11-15  Mg     2.1     11-15    TPro  6.9  /  Alb  4.2  /  TBili  0.8  /  DBili  x   /  AST  33  /  ALT  22  /  AlkPhos  59  11-14        PT/INR - ( 14 Nov 2018 23:04 )   PT: 15.6 sec;   INR: 1.37          PTT - ( 14 Nov 2018 23:04 )  PTT:32.3 sec    RADIOLOGY & ADDITIONAL STUDIES: HPI:  68yr old M with PMHx significant for psoriasis who presents for evaluation of abdominal distention and difficulty defecating.  Patient has been seen in [facility] ED on 2 separate encounters (11/1/18 and 11/5/18), and was discharged after being diagnosed with ileus and discharged with stool softneres and laxatives which provided some relief, but comes back with similar complaints of distension, trouble having a BM, and generalized abdominal discomfort.    According to patient he reports that his symptoms have been present for the past 2 weeks while the patient is under immense stress due to ongoing legal situation. Patient's last BM was yesterday at home, without nausea or vomiting. Also pt had multiple BM in ED, after water enema, and Go-Lytely prep given. Denies any fevers, N/V/D, history of IBD/IBS, history of abdominal surgery, recent abx use, melena, and hematochezia.      EGD - never  Colonoscopy - 10yrs ago normal per patient    T(C): 36.9 (14 Nov 2018 21:44), Max: 37.1 (14 Nov 2018 21:01)  T(F): 98.5 (14 Nov 2018 21:44), Max: 98.7 (14 Nov 2018 21:01)  HR: 96 (14 Nov 2018 21:44) (74 - 96)  BP: 168/104 (14 Nov 2018 21:44) (165/91 - 168/104)  RR: 20 (14 Nov 2018 21:44) (19 - 20)  SpO2: 99% (14 Nov 2018 21:44) (97% - 99%) (15 Nov 2018 02:43)    Allergies  No Known Drug Allergies  pollen (Unknown)    Intolerances      Home Medications:    MEDICATIONS:  MEDICATIONS  (STANDING):  heparin  Injectable 5000 Unit(s) SubCutaneous every 8 hours  lactated ringers. 1000 milliLiter(s) (75 mL/Hr) IV Continuous <Continuous>  potassium chloride  10 mEq/100 mL IVPB 10 milliEquivalent(s) IV Intermittent every 1 hour    MEDICATIONS  (PRN):  acetaminophen   Tablet .. 325 milliGRAM(s) Oral every 6 hours PRN Moderate Pain (4 - 6)  acetaminophen   Tablet .. 650 milliGRAM(s) Oral every 6 hours PRN Severe Pain (7 - 10)  ondansetron Injectable 4 milliGRAM(s) IV Push every 6 hours PRN Nausea    PAST MEDICAL & SURGICAL HISTORY:  Psoriasis  H/O knee surgery    FAMILY HISTORY:  No pertinent family history in first degree relatives    SOCIAL HISTORY:  [place]: [ ] Current, [ ] Former, [ ] Never; Pack Years:  Alcohol: denies  Illicit Drugs: denies    REVIEW OF SYSTEMS:  CONSTITUTIONAL: No weakness, fevers or chills  HEENT: No visual changes; No vertigo or throat pain   NECK: No pain or stiffness  RESPIRATORY: No cough, wheezing, hemoptysis; No shortness of breath  CARDIOVASCULAR: No chest pain or palpitations  GASTROINTESTINAL: No abdominal or epigastric pain. No nausea, vomiting, or hematemesis; No diarrhea or constipation. No melena or hematochezia. Positive distension  GENITOURINARY: No dysuria, frequency or hematuria  NEUROLOGICAL: No numbness or weakness  SKIN: No itching, burning, rashes, or lesions   All other 10 review of systems is negative unless indicated above.    Vital Signs Last 24 Hrs  T(C): 36.8 (15 Nov 2018 06:40), Max: 37.1 (14 Nov 2018 21:01)  T(F): 98.3 (15 Nov 2018 06:40), Max: 98.7 (14 Nov 2018 21:01)  HR: 62 (15 Nov 2018 06:40) (62 - 96)  BP: 121/57 (15 Nov 2018 06:40) (121/57 - 168/104)  BP(mean): --  RR: 20 (15 Nov 2018 06:40) (19 - 20)  SpO2: 98% (15 Nov 2018 06:40) (97% - 99%)      PHYSICAL EXAM:    General: Well developed; well nourished; in no acute distress  Eyes: Anicteric sclerae, moist conjunctivae  HENT: Moist mucous membranes  Neck: Trachea midline, supple  Lungs: Normal respiratory effors and no intercostal retractions  Cardiovascular: RRR  Abdomen: Soft, non-tender. Distended; Hyperactive bowel sounds in LLQ; No rebound or guarding  Extremities: Normal range of motion, No clubbing, cyanosis or edema  Neurological: Alert and oriented x3  Skin: Warm and dry. No obvious rash    LABS:                        12.2   6.8   )-----------( 132      ( 15 Nov 2018 06:33 )             35.9     11-15    134<L>  |  99  |  9   ----------------------------<  97  3.0<L>   |  28  |  0.91    Ca    8.6      15 Nov 2018 06:33  Phos  3.7     11-15  Mg     2.1     11-15    TPro  6.9  /  Alb  4.2  /  TBili  0.8  /  DBili  x   /  AST  33  /  ALT  22  /  AlkPhos  59  11-14    PT/INR - ( 14 Nov 2018 23:04 )   PT: 15.6 sec;   INR: 1.37       PTT - ( 14 Nov 2018 23:04 )  PTT:32.3 sec        RADIOLOGY & ADDITIONAL STUDIES:     CT Abdomen and Pelvis w/ Oral Cont and w/ IV Cont (11.15.18 @ 00:48) >  IMPRESSION:   Since 11/5/2018, worsening of large bowel distention, now measuring up to 7.3 cm in transverse diameter in the sigmoid colon.   Abrupt point of transition within the mid sigmoid colon where there is now more prominent colonic wall thickening with adjacent pericolic fat stranding extending from the point of transition to the rectum. Findings may represent a focal proctocolitis. Recommend further workup and interrogation involving the area of abrupt point of transition within the mid sigmoid colon where there is marked luminal narrowing as underlying stricture/colonic neoplasm cannot be excluded on the basis of this study.   Recommend close continued follow-up to resolution of the partial large bowel obstruction.    Ancillary findings as above.

## 2018-11-15 NOTE — H&P ADULT - NSHPLABSRESULTS_GEN_ALL_CORE
LABS/RADIOLOGY RESULTS:                          13.4   10.2  )-----------( 187      ( 2018 23:04 )             39.8   11-14    134<L>  |  98  |  11  ----------------------------<  102<H>  3.4<L>   |  28  |  0.96    Ca    9.2      2018 23:04    TPro  6.9  /  Alb  4.2  /  TBili  0.8  /  DBili  x   /  AST  33  /  ALT  22  /  AlkPhos  59  11-14  PT/INR - ( 2018 23:04 )   PT: 15.6 sec;   INR: 1.37          PTT - ( 2018 23:04 )  PTT:32.3 secBlood Cultures    Urinalysis Basic - ( 15 Nov 2018 00:12 )    Color: Yellow / Appearance: Clear / S.020 / pH:   Gluc:  / Ketone: Trace mg/dL  / Bili: Negative / Urobili: 0.2 E.U./dL   Blood:  / Protein: NEGATIVE mg/dL / Nitrite: NEGATIVE   Leuk Esterase: NEGATIVE / RBC:  / WBC    Sq Epi:  / Non Sq Epi:  / Bacteria:     < from: CT Abdomen and Pelvis w/ Oral Cont and w/ IV Cont (11.15.18 @ 00:48) >      FINDINGS:    Lower chest: Clear lung bases.     Liver: Smooth in contour. No change in hepatic segment 7 hypodense   lesion, likely representing a hemangioma. Several subcentimeter hypodense   lesions are also seen in the right lobe whichare too small to   characterize. Portal and hepatic veins are patent.    Biliary system: Gallbladder is normal in size. No calcified gallstones.   No biliary ductal dilatation.    Pancreas: No peripancreatic inflammatory change.    Spleen: No splenomegaly.    Adrenal glands: Unremarkable.    Kidneys: Symmetric renal enhancement without hydronephrosis or evidence   of obstructing calculus. Peripelvic left renal cysts. Urinary bladder   grossly unremarkable.     Urinary Bladder: Unremarkable.    Reproductive organs: Enlarged heterogeneous prostate gland measuring 4.5   x 3.5 cm..     Bowel/Peritoneum: Again, there is dilation of of the large bowel   measuring up to 7 cm in the transverse colon and 7.3 cm in the sigmoid   colon, increased since the prior study. Again, there is smooth tapering   in the distal sigmoid/ rectosigmoid junction which is the point of   transition. There is a slight incomplete swirl of the mesentery at this   region. There is now more prominent wall thickening involving the   mid-distal descending colon extending from the point of transition within   the mid sigmoid colon to the rectum. There is adjacent pericolic fat   stranding. Findings are suggestive for active infectious/inflammatory   process such as proctocolitis. At the persistent point of marked   narrowing within the mid sigmoid colon which is unchanged since the prior   study, the possibility of an underlying stricture/colonic neoplasm is not   excluded and further workup is recommended to excludeneoplasm.    Normal caliber appendix. Stool and gas throughout the visualized large   bowel extending to the point of transition within the mid sigmoid colon.    No evidence of intraperitoneal free air or ascites. Small amount of   intrapelvic ascites is present.    Lymph nodes: No bulky pelvic sidewall or retroperitoneal lymphadenopathy   is present. There are scattered subcentimeter periaortic and aortocaval   lymph nodes.    Bones/Soft tissues: Mild degenerative changes of the spine.       IMPRESSION: Since 2018, worsening of large bowel distention, now   measuring up to 7.3 cm in transverse diameter in the sigmoid colon.   Abrupt point of transition within the mid sigmoid colon where there is   now more prominent colonic wall thickening with adjacent pericolic fat   stranding extending from the point of transition to the rectum. Findings   may represent a focal proctocolitis. Recommend further workup and   interrogation involving the area of abrupt point of transition within the   mid sigmoid colon where there is marked luminal narrowing as underlying   stricture/colonic neoplasm cannot be excluded on the basis of this study.   Recommend close continued follow-up to resolution of the partial large   bowel obstruction.    < end of copied text >

## 2018-11-15 NOTE — H&P ADULT - NSHPPHYSICALEXAM_GEN_ALL_CORE
General: NAD  Neurology: A&Ox3, nonfocal, HUTCHINS x 4  Abdominal: Soft, Diffusely distended, tympanic. without tenderness. No evidence of abdominal incisions  Extremities: No edema, + peripheral pulses  Skin: No Rashes, Hematoma, Ecchymosis

## 2018-11-15 NOTE — CONSULT NOTE ADULT - ATTENDING COMMENTS
Admitted for Large Bowel Obstruction, possible stricture/neoplasm on CT, plan for C-scope by GI tomorrow  In the event that patient needs surgery he's medically optimized given RCRI of 0 with METS >4  Combined clinical/surgical risk is low. EKG with NSR, LVH -although no hx of HTN  Monitor BP in hospital as pt had x1 BP elevated earlier today, no need for anti-hypertensives at present  Heart murmur on exam, recent TTE done as outpatient is normal per patient, rec to obtain records of TTE if possible; pt presents no cardiac symptoms so no need to repeat TTE in-hospital (pt is able to walk up 3-4 flight of stairs with no SOB).  No treatment for psoriasis needed at this time  Thanks for the consult and will continue to follow up
68 yr old with no significant PMH presents with partial colonic obstruction. CT scan suggestive of sigmoid stricture and focal pancolitis. ?neoplasm/stricture. Will plan for colonscopy tomorrow. Clear liquids today. Has had golytely prep. Should get a bottle of Mag citrate this evening, and tap water enema in the morning tomorrow before procedure.

## 2018-11-15 NOTE — CONSULT NOTE ADULT - ASSESSMENT
68M PMH psoriasis, varicose veins admitted to surgery for large bowel obstruction w/ plan for colonoscopy tomorrow, med consulted for preop eval    #Preop eval - pt w/ aortic murmur consistent w/ stenosis  -RCRI 0 low risk for cardiac event  -METs>4  -low risk procedure - colonoscopy  -given asx murmur, pt low risk for cardiac event during low risk procedure  -if pt requires more invasive surgical procedure, would need OP echo obtained for review given aortic murmur on exam prior to invasive surgery    -discussed w/ attending 68M PMH psoriasis, varicose veins admitted to surgery for large bowel obstruction w/ plan for colonoscopy tomorrow, med consulted for preop eval    #Preop eval - pt w/ aortic murmur consistent w/ stenosis, EKG NSR w/ LVH  -RCRI 0 low risk for cardiac event  -METs>4  -low risk procedure - colonoscopy  -given asx murmur, pt low risk for cardiac event during low risk procedure  -if pt requires more invasive surgical procedure, would need OP echo obtained for review given aortic murmur on exam prior to invasive surgery  -PCP: located at [facility] on [facility]; Cardio: Dr. Stewart at [address] [facility].    -discussed w/ attending

## 2018-11-15 NOTE — H&P ADULT - ASSESSMENT
69 yo M w/ multiple ED visits for ileus and distension, now with CT evidence of partial Large bowel obstruction, and concern for rectosigmoid stricture    Will admit to regional under Dr. Thomas  GI consult for colonoscopy -- fellow aware  CLD/IVF  Minimize narcotics  Zofran PRN  HSQ/SCD  AM labs    Seen and discussed with chief resident and surgical attending

## 2018-11-15 NOTE — CONSULT NOTE ADULT - ASSESSMENT
69 yo M, w/ PMHx of psoriasis has been seen in [facility] ED 2 separate encounters, now presents with similar complaints of distension, trouble having BM, and generalized abdominal discomfort and reason for GI consult.    Large bowel obstruction/Constipation likely d/t stricture 2/2 colitis vs malignancy, unlikely IBD  - afebrile, hemodynamically stable  - distended abdomen w/ hyperactive bowel sounds  - 67 yo M, w/ PMHx of psoriasis has been seen in [facility] ED 2 separate encounters, now presents with similar complaints of distension, trouble having BM, and generalized abdominal discomfort and reason for GI consult.    Large bowel obstruction/Constipation likely d/t stricture 2/2 colitis vs malignancy, unlikely IBD  - afebrile, hemodynamically stable  - distended abdomen w/ hyperactive bowel sounds  - Worsening CT abdomen showing  worsening of large bowel distention, measuring up to 7.3 cm in transverse diameter in the sigmoid colon w/ abrupt point of transition within the mid sigmoid colon with wall thickening and adjacent pericolic fat stranding extending to the rectum. 69 yo M, w/ PMHx of psoriasis has been seen in [facility] ED 2 separate encounters, now presents with similar complaints of distension, trouble having BM, and generalized abdominal discomfort and reason for GI consult.    Large bowel obstruction/Constipation likely d/t colitis causing narrowing, possible stricture vs malignancy, unlikely IBD  - afebrile, hemodynamically stable  - distended abdomen w/ hyperactive bowel sounds  - Worsening CT abdomen showing  worsening of large bowel distention, measuring up to 7.3 cm in transverse diameter in the sigmoid colon w/ abrupt point of transition within the mid sigmoid colon with wall thickening and adjacent pericolic fat stranding extending to the rectum.  - NPO for potential procedure, if no procedure please put patient on clear liquid diet and make NPO at midnight  - add on for colonoscopy w possible dilation/stent hopefully today, if not patient will be scheduled for tomorrow  - Magnesium citrate and water enemas to empty the bowel  - replete electrolytes as needed 69 yo M, w/ PMHx of psoriasis has been seen in [facility] ED 2 separate encounters, now presents with similar complaints of distension, trouble having BM, and generalized abdominal discomfort and reason for GI consult.    Large bowel obstruction/Constipation likely d/t colitis causing narrowing, possible stricture vs malignancy, unlikely IBD  - afebrile, hemodynamically stable  - distended abdomen w/ hyperactive bowel sounds  - Worsening CT abdomen showing  worsening of large bowel distention, measuring up to 7.3 cm in transverse diameter in the sigmoid colon w/ abrupt point of transition within the mid sigmoid colon with wall thickening and adjacent pericolic fat stranding extending to the rectum.  - clear liquid diet and make NPO at midnight for colonoscopy w possible dilation/stent hopefully tomorrow 11/16  - Magnesium citrate and water enemas to for bowel prep  - replete electrolytes as needed 68yr old M with PMHx significant for psoriasis who presents for evaluation of abdominal distention and difficulty defecating.    # Colonic obstruction  - from sigmoid narrowing seen on CT of unclear etiology - possible stricture vs malignancy,  - afebrile, hemodynamically stable  - distended abdomen w/ hyperactive bowel sounds  - obstruction is partial and patient is passing stool after enema and golytely prep  - will plan for colonoscopy to evaluate colon and plan for possible dilation vs stent placement depending on findings   - clears today  - mag citrate 1 bottle at nite  - tap water enema in am  - NPO midnite      Discussed with attending Dr Salazar  GI following

## 2018-11-15 NOTE — H&P ADULT - HISTORY OF PRESENT ILLNESS
69 yo M, w/ no reported PMHx has been seen in [facility] ED 2 separate encounters, now presents with similar complaints of distension, trouble having BM, and generalized abdominal discomfort. Patient was seen in ED on 11/1 & 11/5 was diagnosed with ileus and prescribed laxatives and stool softeners, which he reports helped. However yesterday AM, he had an appointment with a GI doctor, who recommended he gets all of his paperwork from [facility] prior to proceeding. Patient's last BM was yesterday at home, without nausea or vomiting. Also BM in ED, after water enema, and Go-Lytely given.     Patient's last colonoscopy was 10 years ago, which he reports was normal. No history of UC or Crohn's. Denies any episodes of diverticulitis, or LLQ pain requiring admission or ABX.     T(C): 36.9 (14 Nov 2018 21:44), Max: 37.1 (14 Nov 2018 21:01)  T(F): 98.5 (14 Nov 2018 21:44), Max: 98.7 (14 Nov 2018 21:01)  HR: 96 (14 Nov 2018 21:44) (74 - 96)  BP: 168/104 (14 Nov 2018 21:44) (165/91 - 168/104)  RR: 20 (14 Nov 2018 21:44) (19 - 20)  SpO2: 99% (14 Nov 2018 21:44) (97% - 99%)

## 2018-11-15 NOTE — CONSULT NOTE ADULT - SUBJECTIVE AND OBJECTIVE BOX
MEDICINE CONSULT    HPI:   68M PMH psoriasis, varicose veins presents to ED for 17d of abdominal distension. Notes that when he eats he has a "rumbling" w/ discomfort. Has tried Pepto-Bismol noted to have black minimal liquid stools w/ gas passing. Has been seen by PCP who sent him to derm, cardiologist, and another specialist (not GI). Seen in ED x2 d/c-ed w/ miralax/colace and stopped Pepto-Bismol stating that he may be having better BMs however still liquid, now brown w/o hematochezia/melena, positive for flatulence. Last BM was 3AM yesterday, still having flatulence. Had colonoscopy 10yrs ago at [facility] wnl. Associated sx include large belching and nausea. Notes 13lb wgt loss since 2017 attributed to life stressors and landlord situation. Had recent URI, no sx currently. Denies past sx.    ROS: denies F/C/S, CP/pressure/tightness, palpitations, dyspnea, abdominal pain, lightheadedness, HALL    -recently saw cardio w/ OP echo done, wnl as per pt    ADLs/IADLs - able to walk up 2 flights of stairs w/o dyspnea    Home Medications:   -ASA 81mg qd  -Allegra      PAST MEDICAL & SURGICAL HISTORY:  Psoriasis  H/O knee surgery    Family History:   Mother - MI at 56 - smoker    Social History:  denies tobacco, etoh, and rec drug use      T(C): 37.1 (11-15-18 @ 17:37), Max: 37.1 (18 @ 21:01)  HR: 60 (11-15-18 @ 17:37) (60 - 96)  BP: 113/63 (11-15-18 @ 17:37) (113/63 - 168/104)  RR: 18 (11-15-18 @ 17:37) (18 - 20)  SpO2: 97% (11-15-18 @ 17:37) (96% - 99%)  Wt(kg): --Vital Signs Last 24 Hrs  T(C): 37.1 (15 Nov 2018 17:37), Max: 37.1 (2018 21:01)  T(F): 98.7 (15 Nov 2018 17:37), Max: 98.7 (2018 21:01)  HR: 60 (15 Nov 2018 17:37) (60 - 96)  BP: 113/63 (15 Nov 2018 17:37) (113/63 - 168/104)  BP(mean): --  RR: 18 (15 Nov 2018 17:37) (18 - 20)  SpO2: 97% (15 Nov 2018 17:37) (96% - 99%)    PHYSICAL EXAM:  GENERAL: NAD, well-groomed, well-developed, NAD  HEENT: dry MM, no JVD, has radiation of murmur to carotids b/l  CHEST/LUNG: CTA b/l  HEART: Regular rate and rhythm; 3/6 murmur in aortic region radiating to carotids  ABDOMEN: BS present, severely distended, tympanic to percussion, soft, non-tender  EXTREMITIES:  WWP, no edema to LE b/l has b/l venous stasis and silver plaques      Consultant(s) Notes Reviewed:  [x ] YES  [ ] NO  Care Discussed with Consultants/Other Providers [ x] YES  [ ] NO    LABS:                        12.2   6.8   )-----------( 132      ( 15 Nov 2018 06:33 )             35.9     11-15    134<L>  |  99  |  9   ----------------------------<  97  3.0<L>   |  28  |  0.91    Ca    8.6      15 Nov 2018 06:33  Phos  3.7     11-15  Mg     2.1     11-15    TPro  6.9  /  Alb  4.2  /  TBili  0.8  /  DBili  x   /  AST  33  /  ALT  22  /  AlkPhos  59  11-14    PT/INR - ( 2018 23:04 )   PT: 15.6 sec;   INR: 1.37          PTT - ( 2018 23:04 )  PTT:32.3 sec  Urinalysis Basic - ( 15 Nov 2018 00:12 )    Color: Yellow / Appearance: Clear / S.020 / pH: x  Gluc: x / Ketone: Trace mg/dL  / Bili: Negative / Urobili: 0.2 E.U./dL   Blood: x / Protein: NEGATIVE mg/dL / Nitrite: NEGATIVE   Leuk Esterase: NEGATIVE / RBC: x / WBC x   Sq Epi: x / Non Sq Epi: x / Bacteria: x      CAPILLARY BLOOD GLUCOSE            Urinalysis Basic - ( 15 Nov 2018 00:12 )    Color: Yellow / Appearance: Clear / S.020 / pH: x  Gluc: x / Ketone: Trace mg/dL  / Bili: Negative / Urobili: 0.2 E.U./dL   Blood: x / Protein: NEGATIVE mg/dL / Nitrite: NEGATIVE   Leuk Esterase: NEGATIVE / RBC: x / WBC x   Sq Epi: x / Non Sq Epi: x / Bacteria: x        RADIOLOGY & ADDITIONAL TESTS:    CT A/P: worsening of large bowel distention, measuring up to 7.3 cm in transverse diameter in the sigmoid colon w/ abrupt point of transition within the mid sigmoid colon with wall thickening and adjacent pericolic fat stranding extending to the rectum. MEDICINE CONSULT    HPI:   68M PMH psoriasis, varicose veins presents to ED for 17d of abdominal distension. Notes that when he eats he has a "rumbling" w/ discomfort. Has tried Pepto-Bismol noted to have black minimal liquid stools w/ gas passing. Has been seen by PCP who sent him to derm, cardiologist, and another specialist (not GI). Seen in ED x2 d/c-ed w/ miralax/colace and stopped Pepto-Bismol stating that he may be having better BMs however still liquid, now brown w/o hematochezia/melena, positive for flatulence. Last BM was 3AM yesterday, still having flatulence. Had colonoscopy 10yrs ago at [facility] wnl. Associated sx include large belching and nausea. Notes 13lb wgt loss since 2017 attributed to life stressors and landlord situation. Had recent URI, no sx currently. Denies past sx.    ROS: denies F/C/S, CP/pressure/tightness, palpitations, dyspnea, abdominal pain, lightheadedness, HALL    -recently saw cardio w/ OP echo done, wnl as per pt    ADLs/IADLs - able to walk up 3 flights of stairs w/o dyspnea    Home Medications:   -ASA 81mg qd  -Allegra      PAST MEDICAL & SURGICAL HISTORY:  Psoriasis  H/O knee surgery    Family History:   Mother - MI at 56 - smoker    Social History:  denies tobacco, etoh, and rec drug use      T(C): 37.1 (11-15-18 @ 17:37), Max: 37.1 (18 @ 21:01)  HR: 60 (11-15-18 @ 17:37) (60 - 96)  BP: 113/63 (11-15-18 @ 17:37) (113/63 - 168/104)  RR: 18 (11-15-18 @ 17:37) (18 - 20)  SpO2: 97% (11-15-18 @ 17:37) (96% - 99%)  Wt(kg): --Vital Signs Last 24 Hrs  T(C): 37.1 (15 Nov 2018 17:37), Max: 37.1 (2018 21:01)  T(F): 98.7 (15 Nov 2018 17:37), Max: 98.7 (2018 21:01)  HR: 60 (15 Nov 2018 17:37) (60 - 96)  BP: 113/63 (15 Nov 2018 17:37) (113/63 - 168/104)  BP(mean): --  RR: 18 (15 Nov 2018 17:37) (18 - 20)  SpO2: 97% (15 Nov 2018 17:37) (96% - 99%)    PHYSICAL EXAM:  GENERAL: NAD, well-groomed, well-developed, NAD  HEENT: dry MM, no JVD, has radiation of murmur to carotids b/l  CHEST/LUNG: CTA b/l  HEART: Regular rate and rhythm; 3/6 murmur in aortic region radiating to carotids  ABDOMEN: BS present, severely distended, tympanic to percussion, soft, non-tender  EXTREMITIES:  WWP, no edema to LE b/l has b/l venous stasis and silver plaques      Consultant(s) Notes Reviewed:  [x ] YES  [ ] NO  Care Discussed with Consultants/Other Providers [ x] YES  [ ] NO    LABS:                        12.2   6.8   )-----------( 132      ( 15 Nov 2018 06:33 )             35.9     11-15    134<L>  |  99  |  9   ----------------------------<  97  3.0<L>   |  28  |  0.91    Ca    8.6      15 Nov 2018 06:33  Phos  3.7     11-15  Mg     2.1     11-15    TPro  6.9  /  Alb  4.2  /  TBili  0.8  /  DBili  x   /  AST  33  /  ALT  22  /  AlkPhos  59  11-14    PT/INR - ( 2018 23:04 )   PT: 15.6 sec;   INR: 1.37          PTT - ( 2018 23:04 )  PTT:32.3 sec  Urinalysis Basic - ( 15 Nov 2018 00:12 )    Color: Yellow / Appearance: Clear / S.020 / pH: x  Gluc: x / Ketone: Trace mg/dL  / Bili: Negative / Urobili: 0.2 E.U./dL   Blood: x / Protein: NEGATIVE mg/dL / Nitrite: NEGATIVE   Leuk Esterase: NEGATIVE / RBC: x / WBC x   Sq Epi: x / Non Sq Epi: x / Bacteria: x      CAPILLARY BLOOD GLUCOSE            Urinalysis Basic - ( 15 Nov 2018 00:12 )    Color: Yellow / Appearance: Clear / S.020 / pH: x  Gluc: x / Ketone: Trace mg/dL  / Bili: Negative / Urobili: 0.2 E.U./dL   Blood: x / Protein: NEGATIVE mg/dL / Nitrite: NEGATIVE   Leuk Esterase: NEGATIVE / RBC: x / WBC x   Sq Epi: x / Non Sq Epi: x / Bacteria: x        RADIOLOGY & ADDITIONAL TESTS:    CT A/P: worsening of large bowel distention, measuring up to 7.3 cm in transverse diameter in the sigmoid colon w/ abrupt point of transition within the mid sigmoid colon with wall thickening and adjacent pericolic fat stranding extending to the rectum.    EKG: NSR w/ LVH

## 2018-11-16 ENCOUNTER — TRANSCRIPTION ENCOUNTER (OUTPATIENT)
Age: 68
End: 2018-11-16

## 2018-11-16 ENCOUNTER — RESULT REVIEW (OUTPATIENT)
Age: 68
End: 2018-11-16

## 2018-11-16 ENCOUNTER — APPOINTMENT (OUTPATIENT)
Dept: INTERNAL MEDICINE | Facility: CLINIC | Age: 68
End: 2018-11-16

## 2018-11-16 VITALS
DIASTOLIC BLOOD PRESSURE: 83 MMHG | OXYGEN SATURATION: 99 % | SYSTOLIC BLOOD PRESSURE: 169 MMHG | RESPIRATION RATE: 18 BRPM | HEART RATE: 73 BPM | TEMPERATURE: 98 F

## 2018-11-16 LAB
ANION GAP SERPL CALC-SCNC: 8 MMOL/L — SIGNIFICANT CHANGE UP (ref 5–17)
BLD GP AB SCN SERPL QL: NEGATIVE — SIGNIFICANT CHANGE UP
BLD GP AB SCN SERPL QL: NEGATIVE — SIGNIFICANT CHANGE UP
BUN SERPL-MCNC: 5 MG/DL — LOW (ref 7–23)
CALCIUM SERPL-MCNC: 8.6 MG/DL — SIGNIFICANT CHANGE UP (ref 8.4–10.5)
CHLORIDE SERPL-SCNC: 103 MMOL/L — SIGNIFICANT CHANGE UP (ref 96–108)
CO2 SERPL-SCNC: 27 MMOL/L — SIGNIFICANT CHANGE UP (ref 22–31)
CREAT SERPL-MCNC: 0.84 MG/DL — SIGNIFICANT CHANGE UP (ref 0.5–1.3)
GLUCOSE SERPL-MCNC: 105 MG/DL — HIGH (ref 70–99)
HCT VFR BLD CALC: 36.2 % — LOW (ref 39–50)
HGB BLD-MCNC: 11.9 G/DL — LOW (ref 13–17)
MAGNESIUM SERPL-MCNC: 2.2 MG/DL — SIGNIFICANT CHANGE UP (ref 1.6–2.6)
MCHC RBC-ENTMCNC: 25.4 PG — LOW (ref 27–34)
MCHC RBC-ENTMCNC: 32.9 G/DL — SIGNIFICANT CHANGE UP (ref 32–36)
MCV RBC AUTO: 77.4 FL — LOW (ref 80–100)
PHOSPHATE SERPL-MCNC: 3.1 MG/DL — SIGNIFICANT CHANGE UP (ref 2.5–4.5)
PLATELET # BLD AUTO: 133 K/UL — LOW (ref 150–400)
POTASSIUM SERPL-MCNC: 3.4 MMOL/L — LOW (ref 3.5–5.3)
POTASSIUM SERPL-SCNC: 3.4 MMOL/L — LOW (ref 3.5–5.3)
RBC # BLD: 4.68 M/UL — SIGNIFICANT CHANGE UP (ref 4.2–5.8)
RBC # FLD: 13.9 % — SIGNIFICANT CHANGE UP (ref 10.3–16.9)
RH IG SCN BLD-IMP: NEGATIVE — SIGNIFICANT CHANGE UP
RH IG SCN BLD-IMP: NEGATIVE — SIGNIFICANT CHANGE UP
SODIUM SERPL-SCNC: 138 MMOL/L — SIGNIFICANT CHANGE UP (ref 135–145)
WBC # BLD: 5.9 K/UL — SIGNIFICANT CHANGE UP (ref 3.8–10.5)
WBC # FLD AUTO: 5.9 K/UL — SIGNIFICANT CHANGE UP (ref 3.8–10.5)

## 2018-11-16 PROCEDURE — 85610 PROTHROMBIN TIME: CPT

## 2018-11-16 PROCEDURE — 86850 RBC ANTIBODY SCREEN: CPT

## 2018-11-16 PROCEDURE — 74177 CT ABD & PELVIS W/CONTRAST: CPT

## 2018-11-16 PROCEDURE — 80048 BASIC METABOLIC PNL TOTAL CA: CPT

## 2018-11-16 PROCEDURE — 86901 BLOOD TYPING SEROLOGIC RH(D): CPT

## 2018-11-16 PROCEDURE — 83690 ASSAY OF LIPASE: CPT

## 2018-11-16 PROCEDURE — 99233 SBSQ HOSP IP/OBS HIGH 50: CPT | Mod: GC

## 2018-11-16 PROCEDURE — 88305 TISSUE EXAM BY PATHOLOGIST: CPT

## 2018-11-16 PROCEDURE — 74022 RADEX COMPL AQT ABD SERIES: CPT

## 2018-11-16 PROCEDURE — 88313 SPECIAL STAINS GROUP 2: CPT

## 2018-11-16 PROCEDURE — 85730 THROMBOPLASTIN TIME PARTIAL: CPT

## 2018-11-16 PROCEDURE — 80053 COMPREHEN METABOLIC PANEL: CPT

## 2018-11-16 PROCEDURE — 83735 ASSAY OF MAGNESIUM: CPT

## 2018-11-16 PROCEDURE — 85025 COMPLETE CBC W/AUTO DIFF WBC: CPT

## 2018-11-16 PROCEDURE — 85027 COMPLETE CBC AUTOMATED: CPT

## 2018-11-16 PROCEDURE — 81003 URINALYSIS AUTO W/O SCOPE: CPT

## 2018-11-16 PROCEDURE — 86900 BLOOD TYPING SEROLOGIC ABO: CPT

## 2018-11-16 PROCEDURE — 45378 DIAGNOSTIC COLONOSCOPY: CPT

## 2018-11-16 PROCEDURE — 84100 ASSAY OF PHOSPHORUS: CPT

## 2018-11-16 PROCEDURE — 83605 ASSAY OF LACTIC ACID: CPT

## 2018-11-16 PROCEDURE — 99285 EMERGENCY DEPT VISIT HI MDM: CPT

## 2018-11-16 PROCEDURE — 36415 COLL VENOUS BLD VENIPUNCTURE: CPT

## 2018-11-16 RX ORDER — POTASSIUM CHLORIDE 20 MEQ
10 PACKET (EA) ORAL
Qty: 0 | Refills: 0 | Status: COMPLETED | OUTPATIENT
Start: 2018-11-16 | End: 2018-11-16

## 2018-11-16 RX ADMIN — HEPARIN SODIUM 5000 UNIT(S): 5000 INJECTION INTRAVENOUS; SUBCUTANEOUS at 06:01

## 2018-11-16 RX ADMIN — Medication 100 MILLIEQUIVALENT(S): at 15:14

## 2018-11-16 RX ADMIN — HEPARIN SODIUM 5000 UNIT(S): 5000 INJECTION INTRAVENOUS; SUBCUTANEOUS at 13:38

## 2018-11-16 RX ADMIN — Medication 100 MILLIEQUIVALENT(S): at 12:18

## 2018-11-16 NOTE — DISCHARGE NOTE ADULT - MEDICATION SUMMARY - MEDICATIONS TO TAKE
I will START or STAY ON the medications listed below when I get home from the hospital:    Colace 100 mg oral capsule  -- 1 cap(s) by mouth 3 times a day   -- Medication should be taken with plenty of water.    -- Indication: For Constipation    MiraLax oral powder for reconstitution  -- 17 gram(s) by mouth once a day   -- Dilute this medication with liquid before administration.  It is very important that you take or use this exactly as directed.  Do not skip doses or discontinue unless directed by your doctor.    -- Indication: For Constipation

## 2018-11-16 NOTE — PROGRESS NOTE ADULT - SUBJECTIVE AND OBJECTIVE BOX
SUBJECTIVE: This morning, he feels well; his pain is well-controlled. No nausea or vomiting. Passing flatus. No acute complaints.    heparin  Injectable 5000 Unit(s) SubCutaneous every 8 hours      Vital Signs Last 24 Hrs  T(C): 36.8 (2018 14:40), Max: 36.8 (2018 14:40)  T(F): 98.3 (2018 14:40), Max: 98.3 (2018 14:40)  HR: 73 (2018 14:40) (63 - 78)  BP: 169/83 (2018 14:40) (127/66 - 178/98)  BP(mean): --  RR: 18 (2018 14:40) (17 - 19)  SpO2: 99% (2018 14:40) (95% - 99%)  I&O's Detail    15 Nov 2018 07:01  -  2018 07:00  --------------------------------------------------------  IN:    lactated ringers.: 675 mL  Total IN: 675 mL    OUT:    Voided: 550 mL  Total OUT: 550 mL    Total NET: 125 mL          General: NAD, resting comfortably in bed  Pulm: Nonlabored breathing, no respiratory distress  Abd: soft, NT/ND.  Extrem: WWP, no edema        LABS:                        11.9   5.9   )-----------( 133      ( 2018 05:40 )             36.2     -    138  |  103  |  5<L>  ----------------------------<  105<H>  3.4<L>   |  27  |  0.84    Ca    8.6      2018 05:40  Phos  3.1     -  Mg     2.2         TPro  6.9  /  Alb  4.2  /  TBili  0.8  /  DBili  x   /  AST  33  /  ALT  22  /  AlkPhos  59  11-14    PT/INR - ( 2018 23:04 )   PT: 15.6 sec;   INR: 1.37          PTT - ( 2018 23:04 )  PTT:32.3 sec  Urinalysis Basic - ( 15 Nov 2018 00:12 )    Color: Yellow / Appearance: Clear / S.020 / pH: x  Gluc: x / Ketone: Trace mg/dL  / Bili: Negative / Urobili: 0.2 E.U./dL   Blood: x / Protein: NEGATIVE mg/dL / Nitrite: NEGATIVE   Leuk Esterase: NEGATIVE / RBC: x / WBC x   Sq Epi: x / Non Sq Epi: x / Bacteria: x

## 2018-11-16 NOTE — PROGRESS NOTE ADULT - SUBJECTIVE AND OBJECTIVE BOX
INTERVAL HPI/OVERNIGHT EVENTS:  hypertensive this AM also w/ agitation. went for colonoscopy    -has good appetite, denies abdominal pain, still w/o BM has flatus    Vital Signs Last 24 Hrs  T(C): 36.8 (2018 14:40), Max: 37.2 (15 Nov 2018 19:26)  T(F): 98.3 (2018 14:40), Max: 98.9 (15 Nov 2018 19:26)  HR: 73 (2018 14:40) (60 - 78)  BP: 169/83 (2018 14:40) (113/63 - 178/98)  BP(mean): --  RR: 18 (2018 14:40) (17 - 19)  SpO2: 99% (2018 14:40) (95% - 99%)    PHYSICAL EXAM:    PHYSICAL EXAM:  GENERAL: NAD, well-groomed, well-developed, NAD  HEENT: dry MM, no JVD, has radiation of murmur to carotids b/l  CHEST/LUNG: CTA b/l  HEART: Regular rate and rhythm; 3/6 murmur in aortic region radiating to carotids  ABDOMEN: BS present, severely distended, tympanic to percussion, soft, non-tender  EXTREMITIES:  WWP, no edema to LE b/l has b/l venous stasis and silver plaques    MEDICATIONS  (STANDING):  heparin  Injectable 5000 Unit(s) SubCutaneous every 8 hours  lactated ringers. 1000 milliLiter(s) (75 mL/Hr) IV Continuous <Continuous>    MEDICATIONS  (PRN):  acetaminophen   Tablet .. 325 milliGRAM(s) Oral every 6 hours PRN Moderate Pain (4 - 6)  acetaminophen   Tablet .. 650 milliGRAM(s) Oral every 6 hours PRN Severe Pain (7 - 10)  ondansetron Injectable 4 milliGRAM(s) IV Push every 6 hours PRN Nausea      Allergies    No Known Drug Allergies  pollen (Unknown)    Intolerances        LABS:                        11.9   5.9   )-----------( 133      ( 2018 05:40 )             36.2     11-16    138  |  103  |  5<L>  ----------------------------<  105<H>  3.4<L>   |  27  |  0.84    Ca    8.6      2018 05:40  Phos  3.1     11-16  Mg     2.2     11-16    TPro  6.9  /  Alb  4.2  /  TBili  0.8  /  DBili  x   /  AST  33  /  ALT  22  /  AlkPhos  59  11-14    PT/INR - ( 2018 23:04 )   PT: 15.6 sec;   INR: 1.37          PTT - ( 2018 23:04 )  PTT:32.3 sec  Urinalysis Basic - ( 15 Nov 2018 00:12 )    Color: Yellow / Appearance: Clear / S.020 / pH: x  Gluc: x / Ketone: Trace mg/dL  / Bili: Negative / Urobili: 0.2 E.U./dL   Blood: x / Protein: NEGATIVE mg/dL / Nitrite: NEGATIVE   Leuk Esterase: NEGATIVE / RBC: x / WBC x   Sq Epi: x / Non Sq Epi: x / Bacteria: x        RADIOLOGY & ADDITIONAL TESTS:

## 2018-11-16 NOTE — PROGRESS NOTE ADULT - ASSESSMENT
68M PMH psoriasis, varicose veins admitted to surgery for large bowel obstruction w/ plan for colonoscopy tomorrow, med consulted for preop eval and medical management    #HTN - likely 2/2 agitation, normotensive when not agitated  -no acute intervention  -BP check w/ PCP    #Large bowel obstruction - found on colonoscopy to have boggy descending and sigmoid colon  -f/u pathology  -management as per surgery, gi following    -discussed w/ attending

## 2018-11-16 NOTE — PROGRESS NOTE ADULT - ASSESSMENT
67 yo M w/ multiple ED visits for ileus and distension, now with CT evidence of partial Large bowel obstruction, and concern for rectosigmoid stricture    GI for colonoscopy   CLD/IVF, NPO @[facility]  Minimize narcotics  Zofran PRN  HSQ/SCD  AM labs  s/p colonoscopy by GI  will d/c today

## 2018-11-16 NOTE — PROGRESS NOTE ADULT - ATTENDING COMMENTS
C-scope results reviewed, tolerating lunch  Noted to be hypertensive this morning however seems to be due to anxiety/agitation as pt was upset his AM as his belongings were lost  Per Surgery, plan is for discharge  Rec outpatient follow up with PMD

## 2018-11-16 NOTE — DISCHARGE NOTE ADULT - PLAN OF CARE
Recovery Please follow up with Dr Thomas in 1-2 weeks. Her office number is listed below.  You had a colonoscopy that was negative. Please follow up with gastroenterology in 1-2 weeks for the results of the pathology.  Please take the bowel regimen listed above to prevent constipation in the future.

## 2018-11-16 NOTE — DISCHARGE NOTE ADULT - HOSPITAL COURSE
68 year old man with no past medical history presented to the ED with constipation and generalized abdominal pain. His exam and laboratory workup was benign. He underwent medical clearance prior to a colonoscopy, which was negative except for edematous mucosa, which was biopsied. He was discharged on a regular diet with pathology pending. He was encouraged to follow up with Dr Thomas and gastroenterology.

## 2018-11-16 NOTE — DISCHARGE NOTE ADULT - CARE PROVIDER_API CALL
Harriett Thomas), Surgery; Surgical Oncology  [address]  [address] [place]  [place] [address]  Phone: [phone]  Fax: [phone]    Darin Salazar; MBBS), Internal Medicine  [address] [place] [id number]  Phone: [phone]  Fax: [phone]

## 2018-11-16 NOTE — DISCHARGE NOTE ADULT - PATIENT PORTAL LINK FT
You can access the [facility] Patient Portal, offered by [facility], by registering with the following website: [email or web address]

## 2018-11-16 NOTE — DISCHARGE NOTE ADULT - CARE PLAN
Principal Discharge DX:	Abdominal pain  Goal:	Recovery  Assessment and plan of treatment:	Please follow up with Dr Thomas in 1-2 weeks. Her office number is listed below.  You had a colonoscopy that was negative. Please follow up with gastroenterology in 1-2 weeks for the results of the pathology.  Please take the bowel regimen listed above to prevent constipation in the future.

## 2018-11-26 DIAGNOSIS — R10.84 GENERALIZED ABDOMINAL PAIN: ICD-10-CM

## 2018-11-26 DIAGNOSIS — L40.9 PSORIASIS, UNSPECIFIED: ICD-10-CM

## 2018-11-26 DIAGNOSIS — K59.00 CONSTIPATION, UNSPECIFIED: ICD-10-CM

## 2018-11-26 DIAGNOSIS — R10.9 UNSPECIFIED ABDOMINAL PAIN: ICD-10-CM

## 2020-11-24 NOTE — ED ADULT NURSE NOTE - NS ED NURSE LEVEL OF CONSCIOUSNESS MENTAL STATUS
Awake/Cooperative/Alert
Affect and characteristics of appearance, verbalizations, behaviors are appropriate
